# Patient Record
Sex: FEMALE | Race: AMERICAN INDIAN OR ALASKA NATIVE
[De-identification: names, ages, dates, MRNs, and addresses within clinical notes are randomized per-mention and may not be internally consistent; named-entity substitution may affect disease eponyms.]

---

## 2019-02-21 ENCOUNTER — HOSPITAL ENCOUNTER (EMERGENCY)
Dept: HOSPITAL 5 - ED | Age: 19
Discharge: LEFT BEFORE DISCHARGE | End: 2019-02-21
Payer: SELF-PAY

## 2019-02-21 VITALS — SYSTOLIC BLOOD PRESSURE: 126 MMHG | DIASTOLIC BLOOD PRESSURE: 75 MMHG

## 2019-02-21 DIAGNOSIS — R31.9: Primary | ICD-10-CM

## 2019-02-21 DIAGNOSIS — Z3A.16: ICD-10-CM

## 2019-02-21 LAB
BASOPHILS # (AUTO): 0 K/MM3 (ref 0–0.1)
BASOPHILS NFR BLD AUTO: 0.8 % (ref 0–1.8)
BILIRUB UR QL STRIP: (no result)
BLOOD UR QL VISUAL: (no result)
CAOX CRY #/AREA URNS HPF: (no result) /[HPF]
EOSINOPHIL # BLD AUTO: 0.1 K/MM3 (ref 0–0.4)
EOSINOPHIL NFR BLD AUTO: 1.1 % (ref 0–4.3)
HCT VFR BLD CALC: 38.3 % (ref 30.3–42.9)
HGB BLD-MCNC: 13.5 GM/DL (ref 10.1–14.3)
LYMPHOCYTES # BLD AUTO: 2.3 K/MM3 (ref 1.2–5.4)
LYMPHOCYTES NFR BLD AUTO: 36.2 % (ref 13.4–35)
MCHC RBC AUTO-ENTMCNC: 35 % (ref 30–34)
MCV RBC AUTO: 89 FL (ref 79–97)
MONOCYTES # (AUTO): 0.5 K/MM3 (ref 0–0.8)
MONOCYTES % (AUTO): 7.4 % (ref 0–7.3)
MUCOUS THREADS #/AREA URNS HPF: (no result) /HPF
PH UR STRIP: 5 [PH] (ref 5–7)
PLATELET # BLD: 193 K/MM3 (ref 140–440)
RBC # BLD AUTO: 4.29 M/MM3 (ref 3.65–5.03)
RBC #/AREA URNS HPF: 5 /HPF (ref 0–6)
UROBILINOGEN UR-MCNC: < 2 MG/DL (ref ?–2)
WBC #/AREA URNS HPF: 1 /HPF (ref 0–6)

## 2019-02-21 PROCEDURE — 86901 BLOOD TYPING SEROLOGIC RH(D): CPT

## 2019-02-21 PROCEDURE — 85025 COMPLETE CBC W/AUTO DIFF WBC: CPT

## 2019-02-21 PROCEDURE — 86900 BLOOD TYPING SEROLOGIC ABO: CPT

## 2019-02-21 PROCEDURE — 84702 CHORIONIC GONADOTROPIN TEST: CPT

## 2019-02-21 PROCEDURE — 81001 URINALYSIS AUTO W/SCOPE: CPT

## 2019-02-21 PROCEDURE — 36415 COLL VENOUS BLD VENIPUNCTURE: CPT

## 2019-02-21 PROCEDURE — 86850 RBC ANTIBODY SCREEN: CPT

## 2019-02-21 NOTE — EMERGENCY DEPARTMENT REPORT
Blank Doc





- Documentation


Documentation: 





This is a 18-year-old female that presents with hematuria x1 episode.  Patient 

denies any vaginal bleeding.  Denies any symptoms or complaints.  Stated is 

about 16 week pregnant.





This initial assessment diagnostic orders/clinical plan/treatment(s) is/are 

subject to change based on patient's health status, clinical progression and re-

assessment by fellow clinical providers in the ED.  Further treatment and workup

at subsequent clinical providers discretion.  Patient/guardians urged not to 

elope from ED s their condition may be serious if not clinically assessed and 

managed.  Initial orders include:


1-Patient sent to [facility] for further evaluation and treatment


2- UA

## 2020-02-05 ENCOUNTER — HOSPITAL ENCOUNTER (EMERGENCY)
Dept: HOSPITAL 5 - ED | Age: 20
Discharge: HOME | End: 2020-02-05
Payer: COMMERCIAL

## 2020-02-05 VITALS — SYSTOLIC BLOOD PRESSURE: 135 MMHG | DIASTOLIC BLOOD PRESSURE: 80 MMHG

## 2020-02-05 DIAGNOSIS — H92.01: ICD-10-CM

## 2020-02-05 DIAGNOSIS — Z79.899: ICD-10-CM

## 2020-02-05 DIAGNOSIS — J02.9: Primary | ICD-10-CM

## 2020-02-05 NOTE — EMERGENCY DEPARTMENT REPORT
ED General Adult HPI





- General


Chief complaint: Sore Throat


Stated complaint: SORE THROAT


Source: patient


Mode of arrival: Ambulatory


Limitations: No Limitations





- History of Present Illness


Initial comments: 





 Patient is a 18 yo AA female who presented to the ED with c/o acute onset onset

severe sore throat for 3 days, dysphagia, and right ear pain. Patient denies 

dyspnea, nausea, vomiting, dizziness, chest pain, fever, chills, headache, nasal

and sinus congestion.


MD Complaint: Sore throat, dysphagia


-: Sudden, days(s) (3)


Location: mouth


Radiation: non-radiation


Severity scale (0 -10): 7


Quality: burning, aching, sharp


Consistency: constant


Improves with: none


Worsens with: eating


Associated Symptoms: denies other symptoms, loss of appetite.  denies: chest 

pain, cough, diaphoresis, headaches, malaise, nausea/vomiting, rash, seizure, 

shortness of breath, syncope


Treatments Prior to Arrival: none





- Related Data


                                  Previous Rx's











 Medication  Instructions  Recorded  Last Taken  Type


 


Ibuprofen [Motrin] 600 mg PO Q8H PRN #20 tablet 02/05/20 Unknown Rx


 


Lidocaine Viscous 2% 10 ml PO Q6H PRN #120 ml 02/05/20 Unknown Rx


 


Penicillin V Potassium 500 mg PO Q6H #40 tablet 02/05/20 Unknown Rx











                                    Allergies











Allergy/AdvReac Type Severity Reaction Status Date / Time


 


No Known Allergies Allergy   Unverified 02/21/19 20:04














ED Review of Systems


ROS: 


Stated complaint: SORE THROAT


Other details as noted in HPI





Constitutional: denies: chills, fever


Eyes: denies: eye pain, eye discharge, vision change


ENT: throat pain.  denies: ear pain


Respiratory: denies: cough, shortness of breath, wheezing


Cardiovascular: denies: chest pain, palpitations


Endocrine: no symptoms reported


Gastrointestinal: denies: abdominal pain, nausea, diarrhea


Genitourinary: denies: urgency, dysuria, discharge


Musculoskeletal: denies: back pain, joint swelling, arthralgia


Skin: denies: rash, lesions


Neurological: denies: headache, weakness, paresthesias


Psychiatric: denies: anxiety, depression


Hematological/Lymphatic: denies: easy bleeding, easy bruising





ED Past Medical Hx





- Past Medical History


Previous Medical History?: No





- Surgical History


Past Surgical History?: No





- Social History


Smoking Status: Never Smoker


Substance Use Type: None





- Medications


Home Medications: 


                                Home Medications











 Medication  Instructions  Recorded  Confirmed  Last Taken  Type


 


Ibuprofen [Motrin] 600 mg PO Q8H PRN #20 tablet 02/05/20  Unknown Rx


 


Lidocaine Viscous 2% 10 ml PO Q6H PRN #120 ml 02/05/20  Unknown Rx


 


Penicillin V Potassium 500 mg PO Q6H #40 tablet 02/05/20  Unknown Rx














ED Physical Exam





- General


Limitations: No Limitations


General appearance: alert, in no apparent distress





- Head


Head exam: Present: atraumatic, normocephalic, normal inspection





- Eye


Eye exam: Present: normal appearance, PERRL, EOMI


Pupils: Present: normal accommodation





- ENT


ENT exam: Present: mucous membranes moist, TM's normal bilaterally, normal 

external ear exam, other (erythematous oropharynxx and tonsils)





- Neck


Neck exam: Present: normal inspection, full ROM





- Respiratory


Respiratory exam: Present: normal lung sounds bilaterally.  Absent: respiratory 

distress, wheezes, chest wall tenderness, accessory muscle use, decreased breath

 sounds





- Cardiovascular


Cardiovascular Exam: Present: normal rhythm, tachycardia, normal heart sounds.  

Absent: systolic murmur, diastolic murmur, rubs, gallop





- GI/Abdominal


GI/Abdominal exam: Present: soft, normal bowel sounds.  Absent: tenderness, 

guarding, rebound, hyperactive bowel sounds, hypoactive bowel sounds





- Extremities Exam


Extremities exam: Present: normal inspection, full ROM, normal capillary refill





- Back Exam


Back exam: Present: normal inspection, full ROM.  Absent: tenderness, CVA 

tenderness (R), CVA tenderness (L), muscle spasm, vertebral tenderness





- Neurological Exam


Neurological exam: Present: alert, oriented X3, CN II-XII intact, normal gait, 

reflexes normal





- Psychiatric


Psychiatric exam: Present: normal affect, normal mood





- Skin


Skin exam: Present: warm, dry, intact, normal color.  Absent: rash





ED Course


                                   Vital Signs











  02/05/20





  19:39


 


Temperature 97.8 F


 


Pulse Rate 109 H


 


Respiratory 18





Rate 


 


Blood Pressure 135/80


 


O2 Sat by Pulse 98





Oximetry 














ED Medical Decision Making





- Medical Decision Making





This is a 18 yo female who presented to the ED with c/o acute onset sore throat,

 dysphagia and right ear pain for 3 days. In the ED patient is alert and 

oriented x 3 and is in no acute distress. Patient was discharged home on pain 

medications and oral antibiotics for a suspected acute strep pharyngitis. 

Patient was advised to follow up with her PCP in 7-10 days for reevaluation or 

return to the ED immediately if symptoms get worse.





- Differential Diagnosis


Strep pharyngitis; FLU; URI; Lymphadenopathy


Critical care attestation.: 


If time is entered above; I have spent that time in minutes in the direct care 

of this critically ill patient, excluding procedure time.








ED Disposition


Clinical Impression: 


Acute pharyngitis


Qualifiers:


 Pharyngitis/tonsillitis etiology: other specified organisms Qualified Code(s): 

J02.8 - Acute pharyngitis due to other specified organisms





Disposition: DC-01 TO HOME OR SELFCARE


Is pt being admited?: No


Does the pt Need Aspirin: No


Condition: Stable


Instructions:  Pharyngitis (ED), Strep Throat (ED)


Additional Instructions: 


Take medications with food, drink plenty of fluids and follow up with your 

Primary Care Physician in 7-10 days for reevaluation. Return to the ED 

immediately if symptoms get worse


Prescriptions: 


Lidocaine Viscous 2% 10 ml PO Q6H PRN #120 ml


 PRN Reason: Pain , Severe (7-10)


Ibuprofen [Motrin] 600 mg PO Q8H PRN #20 tablet


 PRN Reason: Pain


Penicillin V Potassium 500 mg PO Q6H #40 tablet


Referrals: 


[facility] [Outside] - 7-10 days


Time of Disposition: 20:22


Print Language: ENGLISH

## 2020-02-05 NOTE — EVENT NOTE
ED Screening Note


Date of service: 02/05/20


Time: 20:17


ED Screening Note: 





 Patient is a 20 yo AA female who presented to the ED with c/o acute onset onset

severe sore throat for 3 days, dysphagia, and right ear pain. Patient denies 

dyspnea, nausea, vomiting, dizziness, chest pain, fever, chills, headache, nasal

and sinus congestion. In the triage, patient is alert and oriented x 3 and is in

no acute distress. Patient discharged home on pain medications, oral antibiotics

for a suspected Strep pharyngitis.








This initial assessment/diagnostic orders/clinical plan/treatment(s) is/are 

subject to change based on patients health status, clinical progression and re-

assessment by fellow clinical providers in the ED. Further treatment and workup 

at subsequent clinical providers discretion. Patient/guardian urged not to elope

from the ED as their condition may be serious if not clinically assessed and 

managed. 





Initial orders include: 


None: discharged from triage